# Patient Record
Sex: FEMALE | Race: BLACK OR AFRICAN AMERICAN | NOT HISPANIC OR LATINO | Employment: FULL TIME | URBAN - METROPOLITAN AREA
[De-identification: names, ages, dates, MRNs, and addresses within clinical notes are randomized per-mention and may not be internally consistent; named-entity substitution may affect disease eponyms.]

---

## 2018-10-26 ENCOUNTER — DOCUMENTATION ONLY (OUTPATIENT)
Dept: PEDIATRIC CARDIOLOGY | Facility: CLINIC | Age: 18
End: 2018-10-26

## 2023-05-30 NOTE — PROGRESS NOTES
JOSÉ MANUEL RUSSELL    18 Y old Female, : 2000    Account Number: [id number]    [address] , [place], [place]-[address]    Home: [phone]     Guarantor: MAXIMO RUSSELL Insurance: [employer]   PCP: Silas Chiang Referring: Silas Chiang   Appointment Facility: [facility]      10/26/2018 Progress Notes: KATHY Wilks MD       Reason for Appointment    1. Atrial septal defect established patient    History of Present Illness    Atrial septal defect:   I had the pleasure of seeing this patient in the pediatric cardiology office today. As you may recall, the patient is a 18 year old that I follow status post Amplatzer device closure of an atrial septal defect on 04. The patient was last seen 10 months ago and returns today for early follow up due to her primary care physician hearing a murmur. She had an event monitor placed at her last visit on 2017 due to palpitations but no tracings were called in. The patient complains of shortness of breath. There are no complaints of chest pain, syncope, exercise intolerance, palpitations, arrhythmias, or tachycardia.     Current Medications    Taking     Birth Control     Not-Taking     Naproxen     Medication List reviewed and reconciled with the patient         Past Medical History    Atrial septal defect- resolved    Surgical History    Status post Amplatzer device closure of atrial septal defect 2004    Family History    Paternal Grand Father: , Lung cancer, diagnosed with Cancer    2 brother(s) , 2 sister(s) - healthy.     There is no direct family history of congenital heart disease, sudden death, arrhythmia, hypertension, hypercholesterolemia, myocardial infarction, stroke, diabetes, or other inheritable disorders.    Social History    Observations: no.   Language: no language barriers.   Tobacco Use Are you a: never smoker.   Smokers in the household: No.   Education: College.    Exercise/activities: none.   Caffeine: yes.   Alcohol: no.   Drugs: no.     Allergies    N.K.D.A.    Hospitalization/Major Diagnostic Procedure    [facility] 10/16-17/18    Review of Systems    Constitutional:   Fatigue none. Fever no. Loss of appetite none. Weakness none. Weight no problems reported.   Neurologic:   Syncope none. Dizziness none. Headaches none. Seizures none.   Ophthalmologic:   Contacts or glasses none. Diminished vision none.   Ear, nose, throat:   Eyes no problems present. Mouth and throat no problems noted. Upper airway obstruction none present. Nasal congestion none.   Respiratory:   Asthma none. Tachypnea none. Cough none. Shortness of breath occasional. Wheezing none.   Cardiovascular:   See HPI for details.   Gastrointestinal:   Abdomen none. Constipation none. Diarrhea none. Nausea none. Vomiting none.   Endocrine:   Thyroid disease none. Diabetes none.   Genitourinary:   Renal disease no problems noted. Urinary tract infection none.   Musculoskeletal:   Joint pain none. Joint swelling none. Muscle no cramping, aching, or stiffness.   Dermatologic:   Itching none. Rash none.   Hematology, oncology:   Anemia none. Abnormal bleeding none. Clotting disorder none.   Allergy:   Seasonal/Environmental none. Food none. Latex none.   Psychology:   ADD or ADHD none . Nervousness none . Mental Illness none . Anxiety none. Depression none.       Vital Signs    Ht 169.0 cm, Wt 82.3 kg, BMI 28.81, heart rate (HR) 84 bpm, blood pressure (BP) Right Arm: 108/67 mmHg, respiratory rate (RR) 20.    Physical Examination    General:   General Appearance: pleasant. Nutrition well nourished. Distress none. Cyanosis none.   HEENT:   Head: atraumatic, normocephalic. Nose: normal. Oral Cavity: normal.   Neck:   Neck: supple. Range of Motion: normal.   Chest:   Shape and Expansion: equal expansion bilaterally, no retractions, no grunting. Chest wall: costosternal tenderness. Breath Sounds: clear to  auscultation, no wheezing, rhonchi, crackles, or stridor. Crackles: none. Wheezes: none.   Heart:   Inspection: normal and acyanotic. Palpation: normal point of maximal impulse. Rate: regular. Rhythm: regular. S1: normal. S2: physiologically split. Clicks: none. Systolic murmurs: none present. Diastolic murmurs: none present. Rubs, Gallops: none. Pulses: radial and brachial artery pulses full and equal.   Abdomen:   Shape: normal. Palpation soft. Tenderness: none. Liver, Spleen: no hepatosplenomegaly.   Musculoskeletal:   Upper extremities: normal. Lower extremities: normal.   Extremities:   Clubbing: no. Cyanosis: no. Edema: no. Pulses: 2+ bilaterally.   Dermatology:   Rash: no rashes.   Neurological:   Motor: normal strength bilaterally. Coordination: normal.       Assessments    1. Atrial septal defect - Q21.1 (Primary)    2. Palpitations - R00.2    In summary, Ash is status post successful Amplatzer closure of her atrial septal defect. She has had an excellent surgical result. Her echocardiogram looks great today. In regards to her shortness of breath, I do not believe there is any cardiac pathology present, and she will call me if anything changes. In regards to her palpitations, I suspect that the symptoms are from benign sinus tachycardia or a sinus arrhythmia that the patient is sensing more than usual. We will montior these complaints for now and Ash will contact me for an event or holter monitor if the symptoms persist. I will plan on a follow up in 5 years for a repeat evaluation of her atrial septal defect. Please contact me if you have any questions or concerns.    Procedures    Electrocardiogram:   Normal Electrocardiogram demonstrated a normal sinus rhythm with normal cardiac intervals and normal atrial and ventricular forces.   Echocardiogram:   Findings The echocardiogram demonstrated the Amplatzer device to be in good position with no residual defect. No obstruction of superior vena cava  or pulmonary veins. Mild tricuspid valve insufficiency with normal right ventricular systolic pressure estimate. The cardiac contractility was good. No pericardial effusion.                   Preventive Medicine    Counseling: Exercise - No activity restrictions. SBE prophylaxis - None indicated.     Procedure Codes    94216 Electrocardiogram (global)    02161 Doppler Complete    81755 Color Flow    36930 2D Congenital Complete    Follow Up    5 years (Reason: ekg; echo)                    Electronically signed by King Wilks MD on 02/07/2019 at 09:05 AM CST    Sign off status: Completed

## 2023-10-10 PROBLEM — D50.0 IRON DEFICIENCY ANEMIA DUE TO CHRONIC BLOOD LOSS: Status: ACTIVE | Noted: 2019-03-20

## 2023-10-10 PROBLEM — R01.1 HEART MURMUR: Status: ACTIVE | Noted: 2019-03-20

## 2023-10-10 PROBLEM — J30.9 CHRONIC ALLERGIC RHINITIS: Status: ACTIVE | Noted: 2021-06-21

## 2023-10-10 PROBLEM — Z87.74 HISTORY OF CONGENITAL HEART DEFECT: Status: ACTIVE | Noted: 2019-03-20

## 2023-10-10 PROBLEM — J45.909 ASTHMA: Status: ACTIVE | Noted: 2023-10-10

## 2023-11-14 ENCOUNTER — TELEPHONE (OUTPATIENT)
Dept: HEMATOLOGY/ONCOLOGY | Facility: CLINIC | Age: 23
End: 2023-11-14
Payer: COMMERCIAL

## 2023-11-14 DIAGNOSIS — D50.0 IRON DEFICIENCY ANEMIA DUE TO CHRONIC BLOOD LOSS: Primary | ICD-10-CM

## 2023-11-14 NOTE — TELEPHONE ENCOUNTER
Spoke to patient in reference to Hematology referral from Dr. Zahida Goldberg.  Appointment scheduled per patient's request next available in [employer].  Appointment notice via pt portal.

## 2023-12-13 ENCOUNTER — DOCUMENTATION ONLY (OUTPATIENT)
Dept: HEMATOLOGY/ONCOLOGY | Facility: CLINIC | Age: 23
End: 2023-12-13
Payer: COMMERCIAL

## 2024-04-01 PROBLEM — Q21.10 ATRIAL SEPTAL DEFECT: Status: ACTIVE | Noted: 2024-04-01

## 2024-04-02 ENCOUNTER — HOSPITAL ENCOUNTER (OUTPATIENT)
Dept: PEDIATRIC CARDIOLOGY | Facility: HOSPITAL | Age: 24
Discharge: HOME | End: 2024-04-02
Attending: PEDIATRICS
Payer: COMMERCIAL

## 2024-04-02 ENCOUNTER — OFFICE VISIT (OUTPATIENT)
Dept: PEDIATRIC CARDIOLOGY | Facility: CLINIC | Age: 24
End: 2024-04-02
Payer: COMMERCIAL

## 2024-04-02 VITALS
BODY MASS INDEX: 36.93 KG/M2 | DIASTOLIC BLOOD PRESSURE: 99 MMHG | OXYGEN SATURATION: 100 % | SYSTOLIC BLOOD PRESSURE: 137 MMHG | HEIGHT: 65 IN | WEIGHT: 221.63 LBS | RESPIRATION RATE: 26 BRPM

## 2024-04-02 DIAGNOSIS — Q21.10 ASD (ATRIAL SEPTAL DEFECT): ICD-10-CM

## 2024-04-02 DIAGNOSIS — Q21.10 ATRIAL SEPTAL DEFECT: Primary | ICD-10-CM

## 2024-04-02 DIAGNOSIS — Z3A.24 24 WEEKS GESTATION OF PREGNANCY: ICD-10-CM

## 2024-04-02 PROBLEM — Z87.74 HISTORY OF CONGENITAL HEART DEFECT: Status: RESOLVED | Noted: 2019-03-20 | Resolved: 2024-04-02

## 2024-04-02 PROBLEM — Z34.90 PREGNANCY: Status: ACTIVE | Noted: 2024-04-02

## 2024-04-02 PROCEDURE — 3008F BODY MASS INDEX DOCD: CPT | Mod: CPTII,,, | Performed by: PEDIATRICS

## 2024-04-02 PROCEDURE — 1159F MED LIST DOCD IN RCRD: CPT | Mod: CPTII,,, | Performed by: PEDIATRICS

## 2024-04-02 PROCEDURE — 99999 PR PBB SHADOW E&M-EST. PATIENT-LVL III: CPT | Mod: PBBFAC,,, | Performed by: PEDIATRICS

## 2024-04-02 PROCEDURE — 3075F SYST BP GE 130 - 139MM HG: CPT | Mod: CPTII,,, | Performed by: PEDIATRICS

## 2024-04-02 PROCEDURE — 99204 OFFICE O/P NEW MOD 45 MIN: CPT | Mod: S$PBB,,, | Performed by: PEDIATRICS

## 2024-04-02 PROCEDURE — 3078F DIAST BP <80 MM HG: CPT | Mod: CPTII,,, | Performed by: PEDIATRICS

## 2024-04-02 PROCEDURE — 99213 OFFICE O/P EST LOW 20 MIN: CPT | Mod: PBBFAC | Performed by: PEDIATRICS

## 2024-04-02 RX ORDER — ASPIRIN 81 MG/1
81 TABLET ORAL DAILY
COMMUNITY

## 2024-04-02 NOTE — PROGRESS NOTES
Thank you for referring your patient Ash Barone to the Pediatric Cardiology clinic for consultation. Please review my findings below and feel free to contact for me for any questions or concerns.    Ash Barone is a 23 y.o. female seen in clinic today for an atrial septal defect.     ASSESSMENT/PLAN:  1. Atrial septal defect  Overview:  Amplatzer device closure of an atrial septal defect on 08/19/04    Assessment & Plan:  In summary, Ash is status post successful Amplatzer closure of her atrial septal defect. She has had an excellent surgical result. Her echocardiogram looks great today. I will plan on a follow up in 5 years for a repeat evaluation of her atrial septal defect. Please contact me if you have any questions or concerns.       2. 24 weeks gestation of pregnancy  Overview:  EGD: 07/23/2024    Assessment & Plan:  I have scheduled her for a fetal echocardiogram next week due to history of ASD and sub optimal views of the heart.       Preventive Medicine:  SBE prophylaxis - None indicated  Exercise - No activity restrictions    Follow Up:  Follow up in about 5 years (around 4/2/2029) for Echocardiogram, EKG.  Follow up fetal echocardiogram in 1-2 weeks      SUBJECTIVE:  XENIA Aleman is a 23 y.o. whom I follow status post Amplatzer device closure of an atrial septal defect on 08/19/04. The patient was last seen over 5 years ago and returns today for late follow up. The patient is currently 24w0d gestation. There are no complaints of chest pain, shortness of breath, palpitations, decreased activity, exercise intolerance, tachycardia, dizziness, syncope, or documented arrhythmias.    Review of patient's allergies indicates:  No Known Allergies    Current Outpatient Medications:     aspirin (ECOTRIN) 81 MG EC tablet, Take 81 mg by mouth once daily., Disp: , Rfl:     ferrous sulfate 325 (65 FE) MG EC tablet, Take 1 tablet (325 mg total) by mouth 2 (two) times daily., Disp: 60 tablet, Rfl:  "5    metFORMIN (GLUCOPHAGE-XR) 500 MG ER 24hr tablet, Take 1 tablet (500 mg total) by mouth daily with breakfast., Disp: 90 tablet, Rfl: 0    prenatal no115/iron/folic acid (PRENATAL 19 ORAL), Take by mouth., Disp: , Rfl:     promethazine (PHENERGAN) 25 MG tablet, Take 1 tablet (25 mg total) by mouth nightly as needed for Nausea. (Patient not taking: Reported on 3/14/2024), Disp: 30 tablet, Rfl: 0    Past Medical History:   Diagnosis Date    Anemia     Asthma     Atrial septal defect 2004    resolved    Heart murmur     PCOS (polycystic ovarian syndrome)     Pregnancy     EGD: 07/23/2024      Past Surgical History:   Procedure Laterality Date    CARDIAC CATHETERIZATION  08/19/2004    Amplatzer device closure of an atrial septal defect on 08/19/04     Family History   Problem Relation Age of Onset    Diabetes Mother     Breast cancer Mother     There is no direct family history of congenital heart disease, sudden death, arrythmia, hypertension, hypercholesterolemia, myocardial infarction, stroke, or other inheritable disorders.    Social History     Socioeconomic History    Marital status: Single   Tobacco Use    Smoking status: Never    Smokeless tobacco: Never   Substance and Sexual Activity    Alcohol use: Not Currently     Comment: socially    Drug use: Never    Sexual activity: Yes     Partners: Male   Social History Narrative    The patient lives with her parents, 1 brother, and 1 sister.  There are no smokers living in the household.       Review of Systems   A comprehensive review of symptoms was completed and negative except as noted above.    OBJECTIVE:  Vital signs  Vitals:    04/02/24 0838 04/02/24 0841   BP: 110/66 (!) 137/99   BP Location: Right arm Left leg   Patient Position: Sitting Sitting   BP Method: Large (Automatic) Large (Automatic)   Resp: (!) 26    SpO2: 100%    Weight: 100.5 kg (221 lb 9.6 oz)    Height: 5' 5.35" (1.66 m)       Body mass index is 36.48 kg/m².    Physical Exam  Vitals " reviewed.   Constitutional:       General: She is not in acute distress.     Appearance: Normal appearance. She is normal weight. She is not ill-appearing, toxic-appearing or diaphoretic.   HENT:      Head: Normocephalic and atraumatic.      Nose: Nose normal.      Mouth/Throat:      Mouth: Mucous membranes are moist.   Cardiovascular:      Rate and Rhythm: Normal rate and regular rhythm.      Pulses: Normal pulses.           Radial pulses are 2+ on the right side.        Femoral pulses are 2+ on the right side.     Heart sounds: Normal heart sounds, S1 normal and S2 normal. No murmur heard.     No friction rub. No gallop.   Pulmonary:      Effort: Pulmonary effort is normal.      Breath sounds: Normal breath sounds.   Abdominal:      General: There is no distension.      Tenderness: There is no abdominal tenderness.      Comments: gravid   Musculoskeletal:      Cervical back: Neck supple.   Skin:     General: Skin is warm and dry.      Capillary Refill: Capillary refill takes less than 2 seconds.   Neurological:      General: No focal deficit present.      Mental Status: She is alert.        Electrocardiogram:  Normal sinus rhythm with normal cardiac intervals and normal atrial and ventricular forces    Echocardiogram:  Status post Amplatzer ASD device closure   Amplatzer device to be in good position with no residual defect.   No obstruction of superior vena cava or pulmonary veins.   No significant atrioventricular insufficiency.   The cardiac contractility was good.   No pericardial effusion      King Wilks MD  [facility] - [facility]  [id number] [place] [id number]  Dept: [phone]  Dept Fax: [phone]

## 2024-04-11 ENCOUNTER — OFFICE VISIT (OUTPATIENT)
Dept: PEDIATRIC CARDIOLOGY | Facility: CLINIC | Age: 24
End: 2024-04-11
Payer: COMMERCIAL

## 2024-04-11 DIAGNOSIS — Z3A.25 25 WEEKS GESTATION OF PREGNANCY: ICD-10-CM

## 2024-04-11 DIAGNOSIS — O35.2XX0 HEREDITARY FAMILIAL DISEASE AFFECTING MANAGEMENT OF MOTHER AND POSSIBLY AFFECTING FETUS, ANTEPARTUM, SINGLE OR UNSPECIFIED FETUS: Primary | ICD-10-CM

## 2024-04-11 PROCEDURE — 99214 OFFICE O/P EST MOD 30 MIN: CPT | Mod: S$GLB,,, | Performed by: PEDIATRICS

## 2024-04-11 PROCEDURE — 1159F MED LIST DOCD IN RCRD: CPT | Mod: CPTII,S$GLB,, | Performed by: PEDIATRICS

## 2024-04-11 PROCEDURE — 1160F RVW MEDS BY RX/DR IN RCRD: CPT | Mod: CPTII,S$GLB,, | Performed by: PEDIATRICS

## 2024-04-11 NOTE — PROGRESS NOTES
[facility] - Fetal Cardiology Clinic      OB:  Dr. Madalyn Doshi    MFM:  N/A    Today, I had the pleasure of evaluating Ash Barone who is now 23 y.o. and carrying her first pregnancy at 25 weeks gestation with an KEVIN of 24. She was referred for evaluation of the fetal heart due to sub optimal views and maternal history of congenital heart disease (ASD s/p device closure).      She is carrying a female fetus, named Aman.      Obstetric History:    .  Her OB history is otherwise unremarkable.     Past Medical History:   Diagnosis Date    Anemia     Asthma     Atrial septal defect     resolved    Heart murmur     PCOS (polycystic ovarian syndrome)     Pregnancy     EGD: 2024       Current Outpatient Medications:     aspirin (ECOTRIN) 81 MG EC tablet, Take 81 mg by mouth once daily., Disp: , Rfl:     ferrous sulfate 325 (65 FE) MG EC tablet, Take 1 tablet (325 mg total) by mouth 2 (two) times daily., Disp: 60 tablet, Rfl: 5    metFORMIN (GLUCOPHAGE-XR) 500 MG ER 24hr tablet, Take 1 tablet (500 mg total) by mouth daily with breakfast., Disp: 90 tablet, Rfl: 0    prenatal no115/iron/folic acid (PRENATAL 19 ORAL), Take by mouth., Disp: , Rfl:     promethazine (PHENERGAN) 25 MG tablet, Take 1 tablet (25 mg total) by mouth nightly as needed for Nausea. (Patient not taking: Reported on 3/14/2024), Disp: 30 tablet, Rfl: 0    Family History: Negative for congenital heart disease, early coronary artery disease, sudden unexplained death, connective tissues disorders, genetic syndromes, multiple miscarriages or other congenital anomalies.       FETAL ECHOCARDIOGRAM (summary):    The echocardiogram demonstrated a grossly structurally normal fetal heart. There is a normal fetal foramen ovale with right to left flow. There is no significant atrioventricular valve insufficiency. Good cardiac contractility. The ductus arteriosus was visualized with right to left flow. The aortic arch  appeared normal in size without obstruction. No pericardial effusion.       Impression:  Single active female fetus at 25 wga.  Normal fetal echocardiogram.      Todays fetal echocardiogram is normal, within the limitations of fetal echocardiography.  I discussed with her that fetal echocardiography is insufficiently sensitive to rule out all septal defects, anomalies of pulmonary and systemic veins, arch anomalies, and some valvar abnormalities, nor can it ensure that the ductus arteriosus and foramen ovale will spontaneously close.       Recommendations:  Location, timing, and mode of delivery will be determined by the obstetrical team.  She does not require further follow-up in the fetal echocardiography clinic, but I would be happy to see her again if additional questions or concerns arise.    Should there be any concerns about the baby's heart after birth, a post-sammi echocardiogram and cardiology consultation are recommended.       The above information was discussed in detail including the use of diagrams, with 35 minutes of total face to face time, with greater than 50% with counseling and coordination of care.  The discussion of the diagnosis and treatment options is as described above.        Blaine Wilks MD  Pediatric Cardiology  [address] [facility]  [place] [address]  Office: [phone]  Cell: [phone]

## 2024-04-15 PROBLEM — O35.2XX0 HEREDITARY DISEASE IN FAMILY POSSIBLY AFFECTING FETUS, AFFECTING MANAGEMENT OF MOTHER, ANTEPARTUM CONDITION OR COMPLICATION: Status: ACTIVE | Noted: 2024-04-15

## 2024-07-09 ENCOUNTER — PATIENT MESSAGE (OUTPATIENT)
Dept: RESEARCH | Facility: HOSPITAL | Age: 24
End: 2024-07-09
Payer: COMMERCIAL